# Patient Record
Sex: MALE | Race: ASIAN | NOT HISPANIC OR LATINO | URBAN - METROPOLITAN AREA
[De-identification: names, ages, dates, MRNs, and addresses within clinical notes are randomized per-mention and may not be internally consistent; named-entity substitution may affect disease eponyms.]

---

## 2023-11-19 ENCOUNTER — EMERGENCY (EMERGENCY)
Facility: HOSPITAL | Age: 58
LOS: 0 days | Discharge: HOME | End: 2023-11-19
Attending: EMERGENCY MEDICINE
Payer: SELF-PAY

## 2023-11-19 VITALS
HEART RATE: 77 BPM | DIASTOLIC BLOOD PRESSURE: 72 MMHG | TEMPERATURE: 98 F | SYSTOLIC BLOOD PRESSURE: 138 MMHG | HEIGHT: 62.99 IN | WEIGHT: 132.28 LBS | RESPIRATION RATE: 18 BRPM | OXYGEN SATURATION: 98 %

## 2023-11-19 DIAGNOSIS — R36.1 HEMATOSPERMIA: ICD-10-CM

## 2023-11-19 DIAGNOSIS — N39.0 URINARY TRACT INFECTION, SITE NOT SPECIFIED: ICD-10-CM

## 2023-11-19 DIAGNOSIS — R31.9 HEMATURIA, UNSPECIFIED: ICD-10-CM

## 2023-11-19 LAB
APPEARANCE UR: CLEAR — SIGNIFICANT CHANGE UP
APPEARANCE UR: CLEAR — SIGNIFICANT CHANGE UP
BACTERIA # UR AUTO: NEGATIVE /HPF — SIGNIFICANT CHANGE UP
BACTERIA # UR AUTO: NEGATIVE /HPF — SIGNIFICANT CHANGE UP
BILIRUB UR-MCNC: NEGATIVE — SIGNIFICANT CHANGE UP
BILIRUB UR-MCNC: NEGATIVE — SIGNIFICANT CHANGE UP
CAST: 0 /LPF — SIGNIFICANT CHANGE UP (ref 0–4)
CAST: 0 /LPF — SIGNIFICANT CHANGE UP (ref 0–4)
COLOR SPEC: YELLOW — SIGNIFICANT CHANGE UP
COLOR SPEC: YELLOW — SIGNIFICANT CHANGE UP
DIFF PNL FLD: NEGATIVE — SIGNIFICANT CHANGE UP
DIFF PNL FLD: NEGATIVE — SIGNIFICANT CHANGE UP
GLUCOSE UR QL: NEGATIVE MG/DL — SIGNIFICANT CHANGE UP
GLUCOSE UR QL: NEGATIVE MG/DL — SIGNIFICANT CHANGE UP
KETONES UR-MCNC: NEGATIVE MG/DL — SIGNIFICANT CHANGE UP
KETONES UR-MCNC: NEGATIVE MG/DL — SIGNIFICANT CHANGE UP
LEUKOCYTE ESTERASE UR-ACNC: ABNORMAL
LEUKOCYTE ESTERASE UR-ACNC: ABNORMAL
NITRITE UR-MCNC: NEGATIVE — SIGNIFICANT CHANGE UP
NITRITE UR-MCNC: NEGATIVE — SIGNIFICANT CHANGE UP
PH UR: 7 — SIGNIFICANT CHANGE UP (ref 5–8)
PH UR: 7 — SIGNIFICANT CHANGE UP (ref 5–8)
PROT UR-MCNC: NEGATIVE MG/DL — SIGNIFICANT CHANGE UP
PROT UR-MCNC: NEGATIVE MG/DL — SIGNIFICANT CHANGE UP
RBC CASTS # UR COMP ASSIST: 0 /HPF — SIGNIFICANT CHANGE UP (ref 0–4)
RBC CASTS # UR COMP ASSIST: 0 /HPF — SIGNIFICANT CHANGE UP (ref 0–4)
SP GR SPEC: 1.01 — SIGNIFICANT CHANGE UP (ref 1–1.03)
SP GR SPEC: 1.01 — SIGNIFICANT CHANGE UP (ref 1–1.03)
SQUAMOUS # UR AUTO: 1 /HPF — SIGNIFICANT CHANGE UP (ref 0–5)
SQUAMOUS # UR AUTO: 1 /HPF — SIGNIFICANT CHANGE UP (ref 0–5)
UROBILINOGEN FLD QL: 0.2 MG/DL — SIGNIFICANT CHANGE UP (ref 0.2–1)
UROBILINOGEN FLD QL: 0.2 MG/DL — SIGNIFICANT CHANGE UP (ref 0.2–1)
WBC UR QL: 33 /HPF — HIGH (ref 0–5)
WBC UR QL: 33 /HPF — HIGH (ref 0–5)

## 2023-11-19 PROCEDURE — 99284 EMERGENCY DEPT VISIT MOD MDM: CPT

## 2023-11-19 PROCEDURE — 99283 EMERGENCY DEPT VISIT LOW MDM: CPT

## 2023-11-19 PROCEDURE — 81001 URINALYSIS AUTO W/SCOPE: CPT

## 2023-11-19 PROCEDURE — 87591 N.GONORRHOEAE DNA AMP PROB: CPT

## 2023-11-19 PROCEDURE — 87491 CHLMYD TRACH DNA AMP PROBE: CPT

## 2023-11-19 PROCEDURE — 87086 URINE CULTURE/COLONY COUNT: CPT

## 2023-11-19 RX ORDER — CEFTRIAXONE 500 MG/1
500 INJECTION, POWDER, FOR SOLUTION INTRAMUSCULAR; INTRAVENOUS ONCE
Refills: 0 | Status: DISCONTINUED | OUTPATIENT
Start: 2023-11-19 | End: 2023-11-19

## 2023-11-19 NOTE — ED PROVIDER NOTE - PATIENT PORTAL LINK FT
You can access the FollowMyHealth Patient Portal offered by [facility] by registering at the following website: [email or web address]. By joining [employer]’s FollowMyHealth portal, you will also be able to view your health information using other applications (apps) compatible with our system.

## 2023-11-19 NOTE — ED PROVIDER NOTE - PHYSICAL EXAMINATION
CONSTITUTIONAL: well developed; well nourished; well appearing in no acute distress  HEAD: normocephalic; atraumatic  EYES: no conjunctival injection, no scleral icterus  ENT: no nasal discharge; airway clear.  NECK: supple; non tender. + full passive ROM in all directions  CARD: warm and well perfused, not tachycardic  RESP: breathing comfortably on RA, speaking in full sentences w/o distress  ABD: soft; non-distended; non-tender. No rebound, no guarding, no pulsatile abdominal mass  : performed in presence of chaperone, normal external genitalia, circumcised penis, no genital lesions, no penile or scrotal tenderness or swelling  EXT: moving all extremities spontaneously, normal ROM. No clubbing, cyanosis or edema  SKIN: warm and dry, no lesions noted  NEURO: alert, oriented, CN II-XII grossly intact, motor and sensory grossly intact, speech nonslurred, stable gait, no focal deficits. GCS 15  PSYCH: calm, cooperative, appropriate, good eye contact, logical thought process, no apparent danger to self or others

## 2023-11-19 NOTE — ED ADULT NURSE NOTE - OBJECTIVE STATEMENT
Pt with C/O blood in the urine from  yesterday , denies any pain denies fever or chills ,cintia N/V/D.

## 2023-11-19 NOTE — ED PROVIDER NOTE - NSFOLLOWUPINSTRUCTIONS_ED_ALL_ED_FT
Lo atendieron en el servicio de urgencias por britt en la orina. [place] puede deberse a zach infección del tracto urinario y/o zach infección de transmisión sexual. En el servicio de urgencias le recetaron antibióticos que debe yadira dos veces al día rabia 2 semanas. También debe realizar un seguimiento con urología para zach evaluación más detallada del sangrado que observó.      Nuestros coordinadores de referencias del Departamento de Emergencias se comunicarán con usted en las próximas 24 a 48 horas, de 9:00 a. m. a 5:00 p. m., con zach sameera de seguimiento. Espere zach llamada telefónica del hospital en sandro período de tiempo. Si no recibe zach llamada o si tiene alguna pregunta o inquietud, puede comunicarse con ellos en  [phone].      Infección del tracto urinario  Zach infección del tracto urinario (ITU) es zach infección de cualquier parte del tracto urinario, que incluye los riñones, los uréteres, la vejiga y la uretra. Los factores de riesgo incluyen ignorar la necesidad de orinar, limpiarse de atrás hacia adelante si es melva, ser un hombre no circuncidado y tener diabetes o un sistema inmunológico débil. Los síntomas incluyen micción frecuente, dolor o ardor al orinar, orina con mal olor, orina turbia, dolor en la parte inferior del abdomen, britt en la orina y fiebre. Si le recetaron un antibiótico, tómelo según las indicaciones de lopez proveedor de atención médica. No deje de yadira el antibiótico incluso si comienza a sentirse mejor.      BUSQUE ATENCIÓN MÉDICA INMEDIATA SI TIENE ALGUNO DE LOS SIGUIENTES SÍNTOMAS: dolor intenso de espalda o abdominal, fiebre, incapacidad para retener líquidos o medicamentos, mareos/aturdimiento o un cambio en el estado mental.    You were seen in the ED for blood in your urine.  This may be from a urinary tract infection and/or sexually transmitted infection.  You were prescribed antibiotics from the ED that you should take twice a day for 2 weeks.  You should also follow up with urology for further evaluation of the bleeding you noted.    Our Emergency Department Referral Coordinators will be reaching out to you in the next 24-48 hours from 9:00am to 5:00pm with a follow up appointment. Please expect a phone call from the hospital in that time frame. If you do not receive a call or if you have any questions or concerns, you can reach them at   [phone].     Urinary Tract Infection    A urinary tract infection (UTI) is an infection of any part of the urinary tract, which includes the kidneys, ureters, bladder, and urethra. Risk factors include ignoring your need to urinate, wiping back to front if female, being an uncircumcised male, and having diabetes or a weak immune system. Symptoms include frequent urination, pain or burning with urination, foul smelling urine, cloudy urine, pain in the lower abdomen, blood in the urine, and fever. If you were prescribed an antibiotic medicine, take it as told by your health care provider. Do not stop taking the antibiotic even if you start to feel better.    SEEK IMMEDIATE MEDICAL CARE IF YOU HAVE ANY OF THE FOLLOWING SYMPTOMS: severe back or abdominal pain, fever, inability to keep fluids or medicine down, dizziness/lightheadedness, or a change in mental status.

## 2023-11-19 NOTE — ED PROVIDER NOTE - CLINICAL SUMMARY MEDICAL DECISION MAKING FREE TEXT BOX
58yM no known PMH p/w hematospermia x 1wk.  Pt well appearing, hemodynamically stable, abd soft/benign and w/ normal  exam.  UA c/w UTI though w/o bacteria, more suggestive of STD.  Urine GC/chlamydia pending but will treat for presumed STDs as pt is sexually active.  D/w pt plan for abx, need for abstinence (and informing/treating all partners) until treated given concern for STD and need for o/p f/u to evaluate further the cause of hematospermia.  Pt expressed concern given no upcoming PCP appointment until December and issue w/ insurance - will get  here in the ED to assist and place referral for PCP and urology f/u.  All questions answered.  Ok to dc with supportive care and return precautions.

## 2023-11-19 NOTE — ED PROVIDER NOTE - OBJECTIVE STATEMENT
Hx provided using  Kaylynn #[id number]  58yM no known PMH (has not seen MD in years) p/w intermittent bloody urine - pt reports painless hematuria noticed only with intercourse over the past week.  Unclear if this is hematuria or hematospermia.  No pain at time of noted bleeding.  No abd pain, back pain, fever, n/v/d.  No hx kidney stones.  Pt is sexually active but denies STDs, genital lesions, penile discharge or testicular pain.

## 2023-11-20 LAB
C TRACH RRNA SPEC QL NAA+PROBE: DETECTED
C TRACH RRNA SPEC QL NAA+PROBE: DETECTED
CULTURE RESULTS: NO GROWTH — SIGNIFICANT CHANGE UP
CULTURE RESULTS: NO GROWTH — SIGNIFICANT CHANGE UP
N GONORRHOEA RRNA SPEC QL NAA+PROBE: SIGNIFICANT CHANGE UP
N GONORRHOEA RRNA SPEC QL NAA+PROBE: SIGNIFICANT CHANGE UP
SPECIMEN SOURCE: SIGNIFICANT CHANGE UP

## 2023-12-03 ENCOUNTER — EMERGENCY (EMERGENCY)
Facility: HOSPITAL | Age: 58
LOS: 0 days | Discharge: HOME | End: 2023-12-03
Attending: EMERGENCY MEDICINE
Payer: SELF-PAY

## 2023-12-03 VITALS
HEIGHT: 62.99 IN | RESPIRATION RATE: 18 BRPM | TEMPERATURE: 98 F | OXYGEN SATURATION: 98 % | SYSTOLIC BLOOD PRESSURE: 145 MMHG | HEART RATE: 91 BPM | DIASTOLIC BLOOD PRESSURE: 74 MMHG

## 2023-12-03 DIAGNOSIS — Z86.19 PERSONAL HISTORY OF OTHER INFECTIOUS AND PARASITIC DISEASES: ICD-10-CM

## 2023-12-03 DIAGNOSIS — Z01.89 ENCOUNTER FOR OTHER SPECIFIED SPECIAL EXAMINATIONS: ICD-10-CM

## 2023-12-03 PROCEDURE — 99284 EMERGENCY DEPT VISIT MOD MDM: CPT

## 2023-12-03 PROCEDURE — 86780 TREPONEMA PALLIDUM: CPT

## 2023-12-03 PROCEDURE — 99283 EMERGENCY DEPT VISIT LOW MDM: CPT

## 2023-12-03 PROCEDURE — 87491 CHLMYD TRACH DNA AMP PROBE: CPT

## 2023-12-03 PROCEDURE — 87591 N.GONORRHOEAE DNA AMP PROB: CPT

## 2023-12-03 PROCEDURE — 36415 COLL VENOUS BLD VENIPUNCTURE: CPT

## 2023-12-03 PROCEDURE — 87389 HIV-1 AG W/HIV-1&-2 AB AG IA: CPT

## 2023-12-03 NOTE — ED PROVIDER NOTE - NSPTACCESSSVCSAPPTDETAILS_ED_ALL_ED_FT
infectious disease and urology - positive for chlamydia now s/p treatment, f/u testing for HIV and RPR

## 2023-12-03 NOTE — ED PROVIDER NOTE - PHYSICAL EXAMINATION
VITAL SIGNS: I have reviewed nursing notes and confirm.  CONSTITUTIONAL: Well-developed; well-nourished; in no acute distress.  RESP: Normal respiratory effort, no tachypnea or distress.   ABD: soft, NT/ND.  NEURO: Alert, oriented. Grossly unremarkable. No focal deficits.  PSYCH: Cooperative, appropriate.

## 2023-12-03 NOTE — ED PROVIDER NOTE - ATTENDING CONTRIBUTION TO CARE
used    58M no pmh p/w evaluation. pt states seen 11/19 for bloody semen, was given abx which he completed. states he was told to come back in 2 weeks to get checked out. states symptoms resolved and feels better no complaints. no fever. no abd apin, nvdc. no dysuria, freq, hematuria. no bloody semen or penile discharge or lesions. pt agreeable for sti testing. pt states he has not been sexually active in the past 2 weeks as instructed while taking abx. pt was given ctx in ED and dc home w doxy. pt didn't know he was positive for chlamydia, was informed today in ED.    on exam, AFVSS, well tabatha nad, ncat, eomi, perrla, mmm, lctab, rrr nl s1s2 no mrg, abd soft ntnd, aaox3, no focal deficits, no le edema or calf ttp,     a/p; STI testing sent, NV home f/u  and ID referral as outpt 1-2 weeks, strict return precautions  used    58M no pmh p/w evaluation. pt states seen 11/19 for bloody semen, was given abx which he completed. states he was told to come back in 2 weeks to get checked out. states symptoms resolved and feels better no complaints. no fever. no abd apin, nvdc. no dysuria, freq, hematuria. no bloody semen or penile discharge or lesions. pt agreeable for sti testing. pt states he has not been sexually active in the past 2 weeks as instructed while taking abx. pt was given ctx in ED and dc home w doxy. pt didn't know he was positive for chlamydia, was informed today in ED.    on exam, AFVSS, well tabatha nad, ncat, eomi, perrla, mmm, lctab, rrr nl s1s2 no mrg, abd soft ntnd, aaox3, no focal deficits, no le edema or calf ttp,     a/p; STI testing sent, [place] home f/u  and ID referral as outpt 1-2 weeks, strict return precautions

## 2023-12-03 NOTE — ED PROVIDER NOTE - PROGRESS NOTE DETAILS
HIEU: Patient already completed course of doxycycline for chlamydia.  Offered repeat testing as well as HIV, syphilis testing.  Labs sent.  Will discharge home with outpatient urology and infectious disease follow-up.  Supportive care return precautions advised.  Patient comfortable with plan and knows results would not come back today.  We are not treating empirically for gonorrhea or chlamydia as patient just completed courses of these medications after his last visit.    Patient to be discharged from ED. Any available test results were discussed with patient and/or family. Verbal instructions given, including instructions to return to ED immediately for any new, worsening, or concerning symptoms. Patient endorsed understanding. Written discharge instructions additionally given, including follow-up plan.

## 2023-12-03 NOTE — ED PROVIDER NOTE - PATIENT PORTAL LINK FT
You can access the FollowMyHealth Patient Portal offered by [facility] by registering at the following website: [email or web address]. By joining [employer]’s FollowMyHealth portal, you will also be able to view your health information using other applications (apps) compatible with our system. You can access the FollowMyHealth Patient Portal offered by [facility] by registering at the following website: [email or web address]. By joining [employer]’s FollowMyHealth portal, you will also be able to view your health information using other applications (apps) compatible with our system.

## 2023-12-03 NOTE — ED PROVIDER NOTE - NSFOLLOWUPCLINICSTOKEN_GEN_ALL_ED_FT
519165:1-3 Days|| ||00\01||False;917247:1-3 Days|| ||00\01||False; 097985:1-3 Days|| ||00\01||False;058724:1-3 Days|| ||00\01||False;

## 2023-12-03 NOTE — ED ADULT NURSE NOTE - NSFALLUNIVINTERV_ED_ALL_ED
Bed/Stretcher in lowest position, wheels locked, appropriate side rails in place/Call bell, personal items and telephone in reach/Instruct patient to call for assistance before getting out of bed/chair/stretcher/Non-slip footwear applied when patient is off stretcher/[place] to call system/Physically safe environment - no spills, clutter or unnecessary equipment/Purposeful proactive rounding/Room/bathroom lighting operational, light cord in reach Bed/Stretcher in lowest position, wheels locked, appropriate side rails in place/Call bell, personal items and telephone in reach/Instruct patient to call for assistance before getting out of bed/chair/stretcher/Non-slip footwear applied when patient is off stretcher/[place] to call system/Physically safe environment - no spills, clutter or unnecessary equipment/Purposeful proactive rounding/Room/bathroom lighting operational, light cord in reach

## 2023-12-03 NOTE — ED PROVIDER NOTE - OBJECTIVE STATEMENT
58-year-old male with recent chlamydia positive testing in ED 2 weeks ago presents back to ED.  Patient states he is unsure why he is here and was told during his last visit to come back in 2 weeks.  At that time he complained of bloody semen, had STD testing done for gonorrhea/committee and was treated with ceftriaxone doxycycline, completed course.  States his symptoms have improved and he has no complaints at this time.  Denies fever/chills, CP, SOB, abdominal pain, N/V/D.  States there is no more blood in his semen. Requesting further STD testing at this time

## 2023-12-03 NOTE — ED PROVIDER NOTE - NSFOLLOWUPINSTRUCTIONS_ED_ALL_ED_FT
Nuestros coordinadores de referencias del Departamento de Emergencias se comunicarán con usted en las próximas 24 a 48 horas, de 9:00 a. m. a 5:00 p. m., con zach sameera de seguimiento. Espere zach llamada telefónica del hospital en sandro período de tiempo. Si no recibe zach llamada o si tiene alguna pregunta o inquietud, puede comunicarse con ellos en  [phone]     ==============================    Clamidia en los hombres  Chlamydia, Male  Male lower body area, showing the bladder, urethra, and penis.  La clamidia es zach infección de transmisión sexual (ITS) cuya causa es zach bacteria. Esta infección se propaga a través del contacto sexual. La clamidia se puede manifestar en diferentes partes del cuerpo, entre las que se incluyen las siguientes:  La uretra. Esta es la parte del cuerpo que drena la orina de la vejiga y a través del pene.  La garganta.  El recto.  El tratamiento de esta afección no es complicado. Sin embargo, si se la turner sin tratar, la clamidia puede derivar en problemas de tai más graves.    ¿Cuáles son las causas?  Esta afección es causada por zach bacteria que se llama Chlamydia trachomatis. La bacteria se contrae de zach adolfo sexual infectada rabia la actividad sexual. La clamidia se puede transmitir mediante el contacto con los genitales, la boca o el recto.    ¿Qué incrementa el riesgo?  Los siguientes factores pueden hacer que sea más propenso a desarrollar esta afección:  No usar condón correctamente o no usar condón cada vez que tiene relaciones sexuales.  Tener zach adolfo sexual nueva o tener más de zach adolfo sexual.  Ser hombre y tener relaciones sexuales con hombres.  Tener actividad sexual antes de los 25 años.  ¿Cuáles son los signos o los síntomas?  En algunos casos, no hay síntomas, especialmente en las primeras etapas de la infección.    Si hay síntomas, algunos de ellos pueden ser los siguientes:  Necesidad de orinar con frecuencia o sensación de dolor o ardor al orinar.  Dolor o hinchazón en los testículos.  Secreción acuosa, tipo mucosidad, del pene.  Enrojecimiento, dolor o hinchazón en el recto, o sangrado o secreción del recto.  Dolor en el abdomen.  Dolor al tener sexo.  ¿Cómo se diagnostica?  Esta afección puede diagnosticarse en función de lo siguiente:  Análisis de orina.  Pruebas de hisopado. Según francia síntomas, el médico puede usar un hisopo de algodón para recoger la secreción de la uretra, recto, nariz o garganta para determinar la presencia de la bacteria.  ¿Cómo se trata?  Esta afección se trata con antibióticos.    Siga estas instrucciones en lopez casa:  Actividad sexual    Hable con francia parejas sexuales sobre lopez infección. [place] incluye a toda adolfo con la que haya tenido sexo oral, anal o vaginal en el transcurso de los 60 días anteriores a la aparición de los síntomas. Las parejas sexuales también se deberán tratar, incluso si no tienen signos de la enfermedad.  No tenga relaciones sexuales hasta que usted y francia parejas sexuales hayan completado el tratamiento y lopez médico lo autorice. Si lopez médico le recetó un tratamiento con un medicamento de dosis única, espere 7 días después de recibir el medicamento para tener relaciones sexuales.  Instrucciones generales    [place] los medicamentos recetados y de venta lindsay leona se lo haya indicado el médico. [place] todo el antibiótico, aunque comience a sentirse mejor.  Es lopez responsabilidad retirar los resultados de la prueba. Consulte al médico o pregunte en el departamento donde se realiza la prueba cuándo estarán listos los resultados.  Concurra a todas las visitas de seguimiento. [place] es importante. Es posible que deba volver a realizarse zach prueba a los 3 meses de jessica finalizado el tratamiento para detectar la presencia de la infección.  ¿Cómo se ann-marie?  Three male condoms in different colors.   Puede reducir el riesgo de contraer clamidia con las siguientes medidas:  Use preservativos de látex o poliuretano correctamente cada vez que tenga relaciones sexuales.  No tenga múltiples parejas sexuales.  Pregúntele a lopez adolfo si se ha realizado pruebas de ITS y si los resultados fueron negativos.  Hágase exámenes de detección con regularidad para detectar la presencia de ITS.  Comuníquese con un médico si:  Los síntomas empeoran o aparecen síntomas nuevos.  Los síntomas no mejoran después de completar el tratamiento.  Tiene fiebre o escalofríos.  Siente dolor rabia las relaciones sexuales.  Tiene dolor o sensibilidad en los testículos.  Tiene síntomas similares a los de zach gripe, leona sudor nocturno, dolor de garganta o dolor muscular.  No puede yadira los antibióticos leona se lo felix indicado.  Resumen  La clamidia es zach infección de transmisión sexual (ITS) cuya causa es zach bacteria. Esta infección se propaga a través del contacto sexual.  Esta afección se trata con antibióticos. Si se la turner sin tratar, la clamidia puede derivar en problemas de tai más graves.  Francia parejas sexuales también deben recibir tratamiento. No tenga sexo hasta que usted y lopez adolfo hayan recibido tratamiento.  [place] los medicamentos leona se lo haya indicado el médico y acuda a todas las visitas de seguimiento para asegurarse de que la infección haya sido tratada hasta desaparecer por completo.  Esta información no tiene leona fin reemplazar el consejo del médico. Asegúrese de hacerle al médico cualquier pregunta que tenga. Nuestros coordinadores de referencias del Departamento de Emergencias se comunicarán con usted en las próximas 24 a 48 horas, de 9:00 a. m. a 5:00 p. m., con zach sameera de seguimiento. Espere zach llamada telefónica del hospital en sandro período de tiempo. Si no recibe zach llamada o si tiene alguna pregunta o inquietud, puede comunicarse con ellos en  [phone]     ==============================    Clamidia en los hombres  Chlamydia, Male  Male lower body area, showing the bladder, urethra, and penis.  La clamidia es zach infección de transmisión sexual (ITS) cuya causa es zach bacteria. Esta infección se propaga a través del contacto sexual. La clamidia se puede manifestar en diferentes partes del cuerpo, entre las que se incluyen las siguientes:  La uretra. Esta es la parte del cuerpo que drena la orina de la vejiga y a través del pene.  La garganta.  El recto.  El tratamiento de esta afección no es complicado. Sin embargo, si se la turner sin tratar, la clamidia puede derivar en problemas de tai más graves.    ¿Cuáles son las causas?  Esta afección es causada por zach bacteria que se llama Chlamydia trachomatis. La bacteria se contrae de zach adolfo sexual infectada rabia la actividad sexual. La clamidia se puede transmitir mediante el contacto con los genitales, la boca o el recto.    ¿Qué incrementa el riesgo?  Los siguientes factores pueden hacer que sea más propenso a desarrollar esta afección:  No usar condón correctamente o no usar condón cada vez que tiene relaciones sexuales.  Tener zach adolfo sexual nueva o tener más de zach adolfo sexual.  Ser hombre y tener relaciones sexuales con hombres.  Tener actividad sexual antes de los 25 años.  ¿Cuáles son los signos o los síntomas?  En algunos casos, no hay síntomas, especialmente en las primeras etapas de la infección.    Si hay síntomas, algunos de ellos pueden ser los siguientes:  Necesidad de orinar con frecuencia o sensación de dolor o ardor al orinar.  Dolor o hinchazón en los testículos.  Secreción acuosa, tipo mucosidad, del pene.  Enrojecimiento, dolor o hinchazón en el recto, o sangrado o secreción del recto.  Dolor en el abdomen.  Dolor al tener sexo.  ¿Cómo se diagnostica?  Esta afección puede diagnosticarse en función de lo siguiente:  Análisis de orina.  Pruebas de hisopado. Según francia síntomas, el médico puede usar un hisopo de algodón para recoger la secreción de la uretra, recto, nariz o garganta para determinar la presencia de la bacteria.  ¿Cómo se trata?  Esta afección se trata con antibióticos.    Siga estas instrucciones en lopez casa:  Actividad sexual    Hable con francia parejas sexuales sobre lopez infección. [place] incluye a toda adolfo con la que haya tenido sexo oral, anal o vaginal en el transcurso de los 60 días anteriores a la aparición de los síntomas. Las parejas sexuales también se deberán tratar, incluso si no tienen signos de la enfermedad.  No tenga relaciones sexuales hasta que usted y francia parejas sexuales hayan completado el tratamiento y lopez médico lo autorice. Si lopez médico le recetó un tratamiento con un medicamento de dosis única, espere 7 días después de recibir el medicamento para tener relaciones sexuales.  Instrucciones generales    [place] los medicamentos recetados y de venta lindsay leona se lo haya indicado el médico. Termine todo el antibiótico, aunque comience a sentirse mejor.  Es lopez responsabilidad retirar los resultados de la prueba. Consulte al médico o pregunte en el departamento donde se realiza la prueba cuándo estarán listos los resultados.  Concurra a todas las visitas de seguimiento. [place] es importante. Es posible que deba volver a realizarse zach prueba a los 3 meses de jessica finalizado el tratamiento para detectar la presencia de la infección.  ¿Cómo se ann-marie?  Three male condoms in different colors.   Puede reducir el riesgo de contraer clamidia con las siguientes medidas:  Use preservativos de látex o poliuretano correctamente cada vez que tenga relaciones sexuales.  No tenga múltiples parejas sexuales.  Pregúntele a lopez adolfo si se ha realizado pruebas de ITS y si los resultados fueron negativos.  Hágase exámenes de detección con regularidad para detectar la presencia de ITS.  Comuníquese con un médico si:  Los síntomas empeoran o aparecen síntomas nuevos.  Los síntomas no mejoran después de completar el tratamiento.  Tiene fiebre o escalofríos.  Siente dolor rabia las relaciones sexuales.  Tiene dolor o sensibilidad en los testículos.  Tiene síntomas similares a los de zach gripe, leona sudor nocturno, dolor de garganta o dolor muscular.  No puede yadira los antibióticos leona se lo felix indicado.  Resumen  La clamidia es zach infección de transmisión sexual (ITS) cuya causa es zach bacteria. Esta infección se propaga a través del contacto sexual.  Esta afección se trata con antibióticos. Si se la turner sin tratar, la clamidia puede derivar en problemas de tai más graves.  Francia parejas sexuales también deben recibir tratamiento. No tenga sexo hasta que usted y lopez adolfo hayan recibido tratamiento.  [place] los medicamentos leona se lo haya indicado el médico y acuda a todas las visitas de seguimiento para asegurarse de que la infección haya sido tratada hasta desaparecer por completo.  Esta información no tiene leona fin reemplazar el consejo del médico. Asegúrese de hacerle al médico cualquier pregunta que tenga.

## 2023-12-03 NOTE — ED PROVIDER NOTE - NSFOLLOWUPCLINICS_GEN_ALL_ED_FT
[facility]  Infectious Diseases  [address] [place] [address]  Phone: [phone]  Fax: [phone]  Follow Up Time: 1-3 Days    [facility]  Urology  .  [place]   Phone: [phone]  Fax:   Follow Up Time: 1-3 Days     [facility]  Infectious Diseases  [address] [place] [address]  Phone: [phone]  Fax: [phone]  Follow Up Time: 1-3 Days    [facility]  Urology  .  [place]   Phone: [phone]  Fax:   Follow Up Time: 1-3 Days

## 2023-12-03 NOTE — ED PROVIDER NOTE - CLINICAL SUMMARY MEDICAL DECISION MAKING FREE TEXT BOX
used    58M no pmh p/w evaluation. pt states seen 11/19 for bloody semen, was given abx which he completed. states he was told to come back in 2 weeks to get checked out. states symptoms resolved and feels better no complaints. no fever. no abd apin, nvdc. no dysuria, freq, hematuria. no bloody semen or penile discharge or lesions. pt agreeable for sti testing. pt states he has not been sexually active in the past 2 weeks as instructed while taking abx. pt was given ctx in ED and dc home w doxy. pt didn't know he was positive for chlamydia, was informed today in ED.    on exam, AFVSS, well tabatha nad, ncat, eomi, perrla, mmm, lctab, rrr nl s1s2 no mrg, abd soft ntnd, aaox3, no focal deficits, no le edema or calf ttp,     a/p; STI testing sent, [place] home f/u  and ID referral as outpt 1-2 weeks, strict return precautions  used    58M no pmh p/w evaluation. pt states seen 11/19 for bloody semen, was given abx which he completed. states he was told to come back in 2 weeks to get checked out. states symptoms resolved and feels better no complaints. no fever. no abd apin, nvdc. no dysuria, freq, hematuria. no bloody semen or penile discharge or lesions. pt agreeable for sti testing. pt states he has not been sexually active in the past 2 weeks as instructed while taking abx. pt was given ctx in ED and dc home w doxy. pt didn't know he was positive for chlamydia, was informed today in ED.    on exam, AFVSS, well tabatha nad, ncat, eomi, perrla, mmm, lctab, rrr nl s1s2 no mrg, abd soft ntnd, aaox3, no focal deficits, no le edema or calf ttp,     a/p; STI testing sent, [place] home f/u  and ID referral as outpt 1-2 weeks, strict return precautions

## 2023-12-04 LAB
C TRACH RRNA SPEC QL NAA+PROBE: SIGNIFICANT CHANGE UP
C TRACH RRNA SPEC QL NAA+PROBE: SIGNIFICANT CHANGE UP
HIV 1+2 AB+HIV1 P24 AG SERPL QL IA: SIGNIFICANT CHANGE UP
HIV 1+2 AB+HIV1 P24 AG SERPL QL IA: SIGNIFICANT CHANGE UP
N GONORRHOEA RRNA SPEC QL NAA+PROBE: SIGNIFICANT CHANGE UP
N GONORRHOEA RRNA SPEC QL NAA+PROBE: SIGNIFICANT CHANGE UP
SPECIMEN SOURCE: SIGNIFICANT CHANGE UP
SPECIMEN SOURCE: SIGNIFICANT CHANGE UP

## 2023-12-05 LAB
T PALLIDUM AB TITR SER: NEGATIVE — SIGNIFICANT CHANGE UP
T PALLIDUM AB TITR SER: NEGATIVE — SIGNIFICANT CHANGE UP